# Patient Record
Sex: MALE | Race: WHITE | NOT HISPANIC OR LATINO | Employment: OTHER | URBAN - METROPOLITAN AREA
[De-identification: names, ages, dates, MRNs, and addresses within clinical notes are randomized per-mention and may not be internally consistent; named-entity substitution may affect disease eponyms.]

---
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
## 2022-07-21 ENCOUNTER — HOSPITAL ENCOUNTER (EMERGENCY)
Facility: HOSPITAL | Age: 22
Discharge: HOME | End: 2022-07-21
Attending: EMERGENCY MEDICINE | Admitting: EMERGENCY MEDICINE
Payer: COMMERCIAL

## 2022-07-21 VITALS
TEMPERATURE: 98.4 F | OXYGEN SATURATION: 98 % | BODY MASS INDEX: 18.32 KG/M2 | RESPIRATION RATE: 19 BRPM | HEART RATE: 74 BPM | WEIGHT: 128 LBS | SYSTOLIC BLOOD PRESSURE: 119 MMHG | DIASTOLIC BLOOD PRESSURE: 69 MMHG | HEIGHT: 70 IN

## 2022-07-21 DIAGNOSIS — S06.0XAA CONCUSSION: Primary | ICD-10-CM
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
## 2022-07-21 PROCEDURE — 99284 EMERGENCY DEPT VISIT MOD MDM: CPT | Performed by: EMERGENCY MEDICINE

## 2022-07-21 PROCEDURE — 99283 EMERGENCY DEPT VISIT LOW MDM: CPT
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
## 2022-07-21 NOTE — DISCHARGE INSTRUCTIONS
For concussion follow up please call:  Jose Nick line [phone], or directly:  [facility] IF sports related then call [facility] if NOT sports related then call Arline Hanley call [phone]    ====================================================    You have been diagnosed with a concussion  A concussion (injury to the brain) can cause sleepiness, headache, dizziness, or vomiting  Your brain needs time to heal from this injury  During this healing process you can expected to feel tired, have a mild headache, experience a decrease in your appetite, have forgetfulness, feel irritable, feel sleepy or even have difficulty with sleeping  These symptoms can last for 14 days or longer  You can use Tylenol every 4-6 hours as needed for a headache  No video games, texting, TV, music, work, or exercising/sports for the next 24 hours    See your PCP for activity recommendations  Rest must be continued until gradually advanced by your PCP     -------------------------------------------------------------------------  The goal for concussion treatment is to avoid getting another concussion & to allow your brain to heal   During this healing time we recommend:    1  Get sleep! Resting your brain to allow it to heal is the single most important thing you can do  Now is the time to go to bed early and allow yourself every chance to get a good night's sleep! 2  Your brain will need good nutrition to heal   Avoid junk food & skipping meals  Eat 3 balanced meals a day  Avoid drinking high sugar containing fruit flavored juices, energy drinks & sodas  If you are of alcohol drinking age, you should avoid alcohol during this healing process as well  3   Do not participate in sports or any other recreation physical activities  Take a break from your exercise routine    During this healing time, even simple jogging may cause you to experience worsening symptoms  You may be allowed to resume your normal activities when you are symptom free or after your primary medical care provider clears you from this concussion  If you are a  your Team Physician, Team Trainer or possibly your primary care medical provider can determine when you can begin to return to activity  There is a process that should be followed and those professionals should follow that process  4   Do not expect yourself to function at your highest level of intellectual ability! Try to reschedule taking meaningful tests, exams, or making important decisions until your brain has healed from the concussion  Video moy, internet-surfing, computer work, & reading should be avoided as those activities can be over-stimulating to your injured brain  5   Follow-up with your primary care medical provider within 1 week to review your progress and to determine if you should be referred to a concussion specialist     --------------------------------------------------------------------------  Stepwise Return to Sports instructions for athletes: You may progress to next step after 24 hours if you are symptom free  If you do experience symptoms, you must return to the previous stage and try again after 24 hours of rest and being symptom free  Step 1  On the day of & for the 1st day after you received your concussion, you are to rest- no activity  When the concussion symptoms have gone away & you feel ready to return to your sport, report to your team training &/or physician to be cleared to progress to Step 2  Step 2  Begin with light aerobic exercise, such as walking or stationary cycling  The key is avoiding significant exertion & absolutely no resistance training  Only after symptom free for at least 1 day of this level of activity can you progress to Step 3  Step 3  Progress to sport/activity-specific exercises    Slowly begin to run if your activity includes running  Add the level of exertion slowly during this step  If at anytime you begin to experience symptoms- stop your activity & rest for the rest of the day  You will return to the last Step, number 2, tomorrow  Step 4  After 1 full day of symptom free activity at Step 3, you can begin to engage in non-contact practice, at full speed  Again, if you begin to experience any symptoms, you must stop your activity! You will return to Step 3 tomorrow  Step 5  After a symptom-free day at Step 4, you can advance to full contact practice and/or game play  As you have learned, if at anytime you experience concussion symptoms, you must stop your activity & only return to practice/play after your team medical staff clears you  If this step-wise approach does not make sense to you or if you have any questions- ask for clarification!    -----------------------------------------------------------------------------------------    Call your doctor or return to the emergency department if worse or:  1  Change in behavior or any unusual behavior occurs  2  Confusion occurs (eg, doesnt know your name or where you are)  3  Speech doesnt make sense  4  Worsening dizziness or unsteadiness on feet occurs or if you cant walk normally  5  Vomiting occurs more than 3 times or more than 8 hours after the injury  6  Poor vision or double vision occurs  7  Bleeding or watery fluid from the ears or nose occurs  8  Seizure, twitching, body jerking, or passing out occurs  9  Headache is worse  10  Weakness or numbness of the face, arms, legs, or body occurs

## 2022-07-21 NOTE — Clinical Note
Agustin Paris was seen and treated in our emergency department on 7/21/2022  Diagnosis:     Neeta Clemente  may return to gym class or sports after being cleared by physician  He may return on this date: 08/01/2022         If you have any questions or concerns, please don't hesitate to call        Raphael Martinez MD    ______________________________           _______________          _______________  Hospital Representative                              Date                                Time

## 2023-07-07 ENCOUNTER — OFFICE VISIT (OUTPATIENT)
Dept: URGENT CARE | Facility: CLINIC | Age: 23
End: 2023-07-07
Payer: COMMERCIAL

## 2023-07-07 VITALS
HEIGHT: 70 IN | DIASTOLIC BLOOD PRESSURE: 77 MMHG | BODY MASS INDEX: 18.9 KG/M2 | HEART RATE: 80 BPM | TEMPERATURE: 97.5 F | RESPIRATION RATE: 18 BRPM | SYSTOLIC BLOOD PRESSURE: 118 MMHG | WEIGHT: 132 LBS | OXYGEN SATURATION: 99 %

## 2023-07-07 DIAGNOSIS — H61.23 IMPACTED CERUMEN OF BOTH EARS: Primary | ICD-10-CM

## 2023-07-07 PROCEDURE — 99213 OFFICE O/P EST LOW 20 MIN: CPT

## 2023-07-07 PROCEDURE — 69210 REMOVE IMPACTED EAR WAX UNI: CPT

## 2023-07-07 NOTE — PATIENT INSTRUCTIONS
Fever/Body Aches: OTC Tylenol, ibuprofen, motrin as directed. Cough: OTC Robitussin or Delsym cough syrup. Sore Throat: Warm saltwater gargles, honey, drink plenty of liquids, soft foods. If severe, can utilize OTC chloraseptic spray. Nasal Congestion: OTC saline nasal spray use as directed or OTC flonase, OTC decongestants such as Sudafed as long as no history of high blood pressure . Cool mist humidifier. Vitamin C and Zinc for immune support.

## 2023-07-07 NOTE — PROGRESS NOTES
[facility]        NAME: Deonna Ryan is a 23 y.o. male  : 2000    MRN: [id number]  DATE: 2023  TIME: 3:00 PM    Assessment and Plan   Impacted cerumen of both ears [H61.23]  1. Impacted cerumen of both ears  Ear cerumen removal        Discussed problem with patient. Bilateral cerumen impaction did not allow visualization of TM. Patient was hesitant on jet lavage and opted to remove cerumen via curette. Attempted to remove cerumen using curette. Ran into hard wax and advised the patient use earwax softener and return in 1 week to remove more wax. Should be using Tylenol and ibuprofen for pain complaints. Patient Instructions       Follow up with PCP in 3-5 days. Proceed to  ER if symptoms worsen. Chief Complaint     Chief Complaint   Patient presents with   • Earache     Left ear pain and ear fullness for about a week; difficulty hearing on that side as well          History of Present Illness       Left ear pain and ear fullness for about a week; difficulty hearing on that side as well. Reports issues with cerumen that he struggled with his whole life. Denies any fevers or chills. Is being followed with by ENT for this issue    Earache   Pertinent negatives include no coughing, headaches, rhinorrhea or sore throat. His past medical history is significant for a chronic ear infection and a tympanostomy tube. There is no history of hearing loss. Review of Systems   Review of Systems   Constitutional: Negative for appetite change, chills, fatigue and fever. HENT: Positive for ear pain. Negative for congestion, postnasal drip, rhinorrhea, sinus pressure, sinus pain and sore throat. Respiratory: Negative for cough, shortness of breath, wheezing and stridor. Cardiovascular: Negative for chest pain and palpitations. Musculoskeletal: Negative for myalgias. Neurological: Negative for dizziness, syncope, light-headedness and headaches.          Current Medications     No current outpatient medications on file. Current Allergies     Allergies as of 07/07/2023 - Reviewed 07/07/2023   Allergen Reaction Noted   • Amoxicillin Anaphylaxis 01/16/2007            The following portions of the patient's history were reviewed and updated as appropriate: allergies, current medications, past family history, past medical history, past social history, past surgical history and problem list.     Past Medical History:   Diagnosis Date   • Concussion    • Perforated ear drum        Past Surgical History:   Procedure Laterality Date   • TYMPANOSTOMY TUBE PLACEMENT Bilateral 2006       Family History   Problem Relation Age of Onset   • No Known Problems Mother    • No Known Problems Father          Medications have been verified. Objective   /77   Pulse 80   Temp 97.5 °F (36.4 °C)   Resp 18   Ht 5' 10" (1.778 m)   Wt 59.9 kg (132 lb)   SpO2 99%   BMI 18.94 kg/m²        Physical Exam     Physical Exam  Vitals and nursing note reviewed. Constitutional:       General: He is not in acute distress. Appearance: Normal appearance. He is normal weight. He is not ill-appearing, toxic-appearing or diaphoretic. HENT:      Head: Normocephalic. Right Ear: Ear canal and external ear normal. There is impacted cerumen. Left Ear: Ear canal and external ear normal. There is impacted cerumen. Nose: Nose normal. No congestion or rhinorrhea. Mouth/Throat:      Mouth: Mucous membranes are moist.      Pharynx: Oropharynx is clear. No oropharyngeal exudate or posterior oropharyngeal erythema. Eyes:      General:         Right eye: No discharge. Left eye: No discharge. Extraocular Movements: Extraocular movements intact. Conjunctiva/sclera: Conjunctivae normal.      Pupils: Pupils are equal, round, and reactive to light. Neck:      Vascular: No carotid bruit. Cardiovascular:      Rate and Rhythm: Normal rate and regular rhythm. Pulses: Normal pulses. Heart sounds: Normal heart sounds. No murmur heard. No friction rub. No gallop. Pulmonary:      Effort: Pulmonary effort is normal. No respiratory distress. Breath sounds: Normal breath sounds. No stridor. No wheezing, rhonchi or rales. Chest:      Chest wall: No tenderness. Musculoskeletal:      Cervical back: Normal range of motion and neck supple. No rigidity or tenderness. Lymphadenopathy:      Cervical: No cervical adenopathy. Neurological:      Mental Status: He is alert. Ear cerumen removal    Date/Time: 7/7/2023 2:30 PM    Performed by: Marcelino Fish PA-C  Authorized by: Marcelino Fish PA-C  Universal Protocol:  Consent: Verbal consent obtained. Written consent obtained. Risks and benefits: risks, benefits and alternatives were discussed  Consent given by: patient  Time out: Immediately prior to procedure a "time out" was called to verify the correct patient, procedure, equipment, support staff and site/side marked as required. Timeout called at: 7/7/2023 2:58 PM.  Patient understanding: patient states understanding of the procedure being performed  Patient consent: the patient's understanding of the procedure matches consent given  Patient identity confirmed: verbally with patient      Patient location:  ED  Procedure details:     Location:  L ear    Procedure type: curette      Approach:  External  Post-procedure details:     Complication:  None    Hearing quality:  Improved    Patient tolerance of procedure:   Tolerated well, no immediate complications

## 2023-09-26 ENCOUNTER — OFFICE VISIT (OUTPATIENT)
Dept: URGENT CARE | Facility: CLINIC | Age: 23
End: 2023-09-26
Payer: COMMERCIAL

## 2023-09-26 VITALS
DIASTOLIC BLOOD PRESSURE: 70 MMHG | SYSTOLIC BLOOD PRESSURE: 104 MMHG | RESPIRATION RATE: 18 BRPM | TEMPERATURE: 97 F | WEIGHT: 130 LBS | OXYGEN SATURATION: 99 % | HEART RATE: 94 BPM | BODY MASS INDEX: 18.61 KG/M2 | HEIGHT: 70 IN

## 2023-09-26 DIAGNOSIS — H61.23 BILATERAL IMPACTED CERUMEN: Primary | ICD-10-CM

## 2023-09-26 PROCEDURE — 99213 OFFICE O/P EST LOW 20 MIN: CPT | Performed by: PHYSICIAN ASSISTANT

## 2023-09-26 PROCEDURE — 69210 REMOVE IMPACTED EAR WAX UNI: CPT | Performed by: PHYSICIAN ASSISTANT

## 2023-09-26 NOTE — PATIENT INSTRUCTIONS
Avoid Q-Tip use  Over the counter debrox drops  Follow up with ENT if symptoms persist  Follow up with PCP in 3-5 days. Proceed to  ER if symptoms worsen.

## 2023-09-26 NOTE — PROGRESS NOTES
[facility]        NAME: Willian Bloom is a 23 y.o. male  : 2000    MRN: [id number]  DATE: 2023  TIME: 11:48 AM    Assessment and Plan   Bilateral impacted cerumen [H61.23]  1. Bilateral impacted cerumen  Ear cerumen removal            Patient Instructions     Avoid Q-Tip use  Over the counter debrox drops  Follow up with ENT if symptoms persist  Follow up with PCP in 3-5 days. Proceed to  ER if symptoms worsen. Chief Complaint     Chief Complaint   Patient presents with   • Ear Fullness     C/o bilateral ear fullness. Onset yesterday. History of Present Illness       Ear Fullness   There is pain in both ears. This is a new problem. The current episode started yesterday. Associated symptoms include hearing loss. He has tried nothing for the symptoms. Review of Systems   Review of Systems   HENT: Positive for ear pain and hearing loss. Current Medications     No current outpatient medications on file. Current Allergies     Allergies as of 2023 - Reviewed 2023   Allergen Reaction Noted   • Amoxicillin Anaphylaxis 2007            The following portions of the patient's history were reviewed and updated as appropriate: allergies, current medications, past family history, past medical history, past social history, past surgical history and problem list.     Past Medical History:   Diagnosis Date   • Concussion    • Perforated ear drum        Past Surgical History:   Procedure Laterality Date   • TYMPANOSTOMY TUBE PLACEMENT Bilateral        Family History   Problem Relation Age of Onset   • No Known Problems Mother    • No Known Problems Father          Medications have been verified. Objective   /70   Pulse 94   Temp (!) 97 °F (36.1 °C)   Resp 18   Ht 5' 10" (1.778 m)   Wt 59 kg (130 lb)   SpO2 99%   BMI 18.65 kg/m²   No LMP for male patient. Physical Exam     Physical Exam  Vitals reviewed. Constitutional:       General: He is not in acute distress. Appearance: He is well-developed. He is not diaphoretic. HENT:      Head: Normocephalic and atraumatic. Right Ear: There is impacted cerumen. Left Ear: There is impacted cerumen. Nose: Nose normal.   Cardiovascular:      Rate and Rhythm: Normal rate and regular rhythm. Heart sounds: Normal heart sounds. No murmur heard. No friction rub. No gallop. Pulmonary:      Effort: Pulmonary effort is normal. No respiratory distress. Breath sounds: Normal breath sounds. No wheezing, rhonchi or rales. Skin:     General: Skin is warm. Neurological:      Mental Status: He is alert. Psychiatric:         Behavior: Behavior normal.         Thought Content: Thought content normal.         Judgment: Judgment normal.       Ear cerumen removal    Date/Time: 9/26/2023 11:30 AM    Performed by: Terryann Kawasaki, PA-C  Authorized by: Terryann Kawasaki, PA-C  Universal Protocol:  Consent: Verbal consent obtained. Risks and benefits: risks, benefits and alternatives were discussed  Consent given by: patient  Patient identity confirmed: verbally with patient      Procedure details:     Location:  R ear and L ear    Procedure type: curette      Approach:  External  Post-procedure details:     Complication:  None    Hearing quality:  Improved    Patient tolerance of procedure:   Tolerated well, no immediate complications

## 2023-09-26 NOTE — LETTER
September 26, 2023     Patient: Ilda Monge   YOB: 2000   Date of Visit: 9/26/2023       To Whom It May Concern:    Milly Robe was seen in the [facility] on 9/26/2023. If you have any questions or concerns, please don't hesitate to call.          Sincerely,        Mich Leon PA-C    CC: No Recipients

## 2023-09-29 ENCOUNTER — APPOINTMENT (EMERGENCY)
Dept: RADIOLOGY | Facility: HOSPITAL | Age: 23
End: 2023-09-29
Payer: COMMERCIAL

## 2023-09-29 ENCOUNTER — APPOINTMENT (EMERGENCY)
Dept: CT IMAGING | Facility: HOSPITAL | Age: 23
End: 2023-09-29
Payer: COMMERCIAL

## 2023-09-29 ENCOUNTER — HOSPITAL ENCOUNTER (EMERGENCY)
Facility: HOSPITAL | Age: 23
Discharge: HOME | End: 2023-09-29
Attending: EMERGENCY MEDICINE | Admitting: EMERGENCY MEDICINE
Payer: COMMERCIAL

## 2023-09-29 VITALS
RESPIRATION RATE: 20 BRPM | WEIGHT: 136.02 LBS | HEART RATE: 87 BPM | BODY MASS INDEX: 19.52 KG/M2 | OXYGEN SATURATION: 100 % | DIASTOLIC BLOOD PRESSURE: 70 MMHG | SYSTOLIC BLOOD PRESSURE: 118 MMHG | TEMPERATURE: 98.4 F

## 2023-09-29 DIAGNOSIS — T07.XXXA MULTIPLE CONTUSIONS: ICD-10-CM

## 2023-09-29 DIAGNOSIS — S69.91XA INJURY OF RIGHT THUMB, INITIAL ENCOUNTER: Primary | ICD-10-CM

## 2023-09-29 LAB
ABO GROUP BLD: NORMAL
ABO GROUP BLD: NORMAL
ALBUMIN SERPL BCP-MCNC: 4.7 G/DL (ref 3.5–5)
ALP SERPL-CCNC: 56 U/L (ref 34–104)
ALT SERPL W P-5'-P-CCNC: 10 U/L (ref 7–52)
ANION GAP SERPL CALCULATED.3IONS-SCNC: 8 MMOL/L
APTT PPP: 26 SECONDS (ref 23–37)
AST SERPL W P-5'-P-CCNC: 17 U/L (ref 13–39)
BASOPHILS # BLD AUTO: 0.04 THOUSANDS/ÂΜL (ref 0–0.1)
BASOPHILS NFR BLD AUTO: 1 % (ref 0–1)
BILIRUB SERPL-MCNC: 0.6 MG/DL (ref 0.2–1)
BLD GP AB SCN SERPL QL: NEGATIVE
BUN SERPL-MCNC: 14 MG/DL (ref 5–25)
CALCIUM SERPL-MCNC: 9.3 MG/DL (ref 8.4–10.2)
CHLORIDE SERPL-SCNC: 104 MMOL/L (ref 96–108)
CO2 SERPL-SCNC: 24 MMOL/L (ref 21–32)
CREAT SERPL-MCNC: 1.02 MG/DL (ref 0.6–1.3)
EOSINOPHIL # BLD AUTO: 0.1 THOUSAND/ÂΜL (ref 0–0.61)
EOSINOPHIL NFR BLD AUTO: 2 % (ref 0–6)
ERYTHROCYTE [DISTWIDTH] IN BLOOD BY AUTOMATED COUNT: 11.8 % (ref 11.6–15.1)
GFR SERPL CREATININE-BSD FRML MDRD: 105 ML/MIN/1.73SQ M
GLUCOSE SERPL-MCNC: 115 MG/DL (ref 65–140)
HCT VFR BLD AUTO: 42.1 % (ref 36.5–49.3)
HGB BLD-MCNC: 14.9 G/DL (ref 12–17)
IMM GRANULOCYTES # BLD AUTO: 0.02 THOUSAND/UL (ref 0–0.2)
IMM GRANULOCYTES NFR BLD AUTO: 0 % (ref 0–2)
INR PPP: 0.97 (ref 0.84–1.19)
LYMPHOCYTES # BLD AUTO: 2.82 THOUSANDS/ÂΜL (ref 0.6–4.47)
LYMPHOCYTES NFR BLD AUTO: 41 % (ref 14–44)
MCH RBC QN AUTO: 31.6 PG (ref 26.8–34.3)
MCHC RBC AUTO-ENTMCNC: 35.4 G/DL (ref 31.4–37.4)
MCV RBC AUTO: 89 FL (ref 82–98)
MONOCYTES # BLD AUTO: 0.55 THOUSAND/ÂΜL (ref 0.17–1.22)
MONOCYTES NFR BLD AUTO: 8 % (ref 4–12)
NEUTROPHILS # BLD AUTO: 3.29 THOUSANDS/ÂΜL (ref 1.85–7.62)
NEUTS SEG NFR BLD AUTO: 48 % (ref 43–75)
NRBC BLD AUTO-RTO: 0 /100 WBCS
PLATELET # BLD AUTO: 215 THOUSANDS/UL (ref 149–390)
PMV BLD AUTO: 9.5 FL (ref 8.9–12.7)
POTASSIUM SERPL-SCNC: 3.4 MMOL/L (ref 3.5–5.3)
PROT SERPL-MCNC: 7.1 G/DL (ref 6.4–8.4)
PROTHROMBIN TIME: 13.2 SECONDS (ref 11.6–14.5)
RBC # BLD AUTO: 4.72 MILLION/UL (ref 3.88–5.62)
RH BLD: POSITIVE
RH BLD: POSITIVE
SODIUM SERPL-SCNC: 136 MMOL/L (ref 135–147)
SPECIMEN EXPIRATION DATE: NORMAL
WBC # BLD AUTO: 6.82 THOUSAND/UL (ref 4.31–10.16)

## 2023-09-29 PROCEDURE — 86850 RBC ANTIBODY SCREEN: CPT | Performed by: EMERGENCY MEDICINE

## 2023-09-29 PROCEDURE — 36415 COLL VENOUS BLD VENIPUNCTURE: CPT | Performed by: EMERGENCY MEDICINE

## 2023-09-29 PROCEDURE — 71045 X-RAY EXAM CHEST 1 VIEW: CPT

## 2023-09-29 PROCEDURE — 71260 CT THORAX DX C+: CPT

## 2023-09-29 PROCEDURE — 93005 ELECTROCARDIOGRAM TRACING: CPT

## 2023-09-29 PROCEDURE — 70450 CT HEAD/BRAIN W/O DYE: CPT

## 2023-09-29 PROCEDURE — 85730 THROMBOPLASTIN TIME PARTIAL: CPT | Performed by: EMERGENCY MEDICINE

## 2023-09-29 PROCEDURE — 73030 X-RAY EXAM OF SHOULDER: CPT

## 2023-09-29 PROCEDURE — 73130 X-RAY EXAM OF HAND: CPT

## 2023-09-29 PROCEDURE — 99284 EMERGENCY DEPT VISIT MOD MDM: CPT

## 2023-09-29 PROCEDURE — 73000 X-RAY EXAM OF COLLAR BONE: CPT

## 2023-09-29 PROCEDURE — 86901 BLOOD TYPING SEROLOGIC RH(D): CPT | Performed by: EMERGENCY MEDICINE

## 2023-09-29 PROCEDURE — 85610 PROTHROMBIN TIME: CPT | Performed by: EMERGENCY MEDICINE

## 2023-09-29 PROCEDURE — 86900 BLOOD TYPING SEROLOGIC ABO: CPT | Performed by: EMERGENCY MEDICINE

## 2023-09-29 PROCEDURE — 72125 CT NECK SPINE W/O DYE: CPT

## 2023-09-29 PROCEDURE — 72170 X-RAY EXAM OF PELVIS: CPT

## 2023-09-29 PROCEDURE — 80053 COMPREHEN METABOLIC PANEL: CPT | Performed by: EMERGENCY MEDICINE

## 2023-09-29 PROCEDURE — 73564 X-RAY EXAM KNEE 4 OR MORE: CPT

## 2023-09-29 PROCEDURE — 85025 COMPLETE CBC W/AUTO DIFF WBC: CPT | Performed by: EMERGENCY MEDICINE

## 2023-09-29 PROCEDURE — 74177 CT ABD & PELVIS W/CONTRAST: CPT

## 2023-09-29 RX ADMIN — IOHEXOL 100 ML: 350 INJECTION, SOLUTION INTRAVENOUS at 19:44

## 2023-09-29 NOTE — Clinical Note
Sonya Hernandez was seen and treated in our emergency department on 9/29/2023. Diagnosis:     Bebeto Jenkins  may return to work on return date. He may return on this date: 10/03/2023         If you have any questions or concerns, please don't hesitate to call.       Semaj Vanegas MD    ______________________________           _______________          _______________  Hospital Representative                              Date                                Time

## 2023-09-29 NOTE — Clinical Note
Mojgan Stahl was seen and treated in our emergency department on 9/29/2023. Diagnosis:     Yani Maddox  may return to work on return date. He may return on this date: 10/03/2023         If you have any questions or concerns, please don't hesitate to call.       Deidre Mckeon MD    ______________________________           _______________          _______________  Hospital Representative                              Date                                Time

## 2023-09-29 NOTE — Clinical Note
Milly Nagel was seen and treated in our emergency department on 9/29/2023. Diagnosis:     Pan Perkins  may return to work on return date. He may return on this date: 10/03/2023         If you have any questions or concerns, please don't hesitate to call.       Leonor Jauregui MD    ______________________________           _______________          _______________  Hospital Representative                              Date                                Time
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
## 2023-09-30 NOTE — DISCHARGE INSTRUCTIONS
Ice to areas of pain. Please take Tylenol and/or Advil/Motrin/ibuprofen as needed for pain and swelling.

## 2023-10-02 ENCOUNTER — TELEPHONE (OUTPATIENT)
Age: 23
End: 2023-10-02

## 2023-10-02 LAB
ATRIAL RATE: 81 BPM
P AXIS: 73 DEGREES
PR INTERVAL: 138 MS
QRS AXIS: 85 DEGREES
QRSD INTERVAL: 88 MS
QT INTERVAL: 382 MS
QTC INTERVAL: 443 MS
T WAVE AXIS: 72 DEGREES
VENTRICULAR RATE: 81 BPM
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
## 2023-10-03 ENCOUNTER — OFFICE VISIT (OUTPATIENT)
Dept: OBGYN CLINIC | Facility: MEDICAL CENTER | Age: 23
End: 2023-10-03
Payer: COMMERCIAL

## 2023-10-03 ENCOUNTER — APPOINTMENT (OUTPATIENT)
Dept: RADIOLOGY | Facility: MEDICAL CENTER | Age: 23
End: 2023-10-03
Payer: COMMERCIAL

## 2023-10-03 VITALS
HEART RATE: 79 BPM | HEIGHT: 70 IN | SYSTOLIC BLOOD PRESSURE: 121 MMHG | WEIGHT: 129.8 LBS | DIASTOLIC BLOOD PRESSURE: 68 MMHG | BODY MASS INDEX: 18.58 KG/M2

## 2023-10-03 DIAGNOSIS — S50.01XA CONTUSION OF RIGHT ELBOW, INITIAL ENCOUNTER: ICD-10-CM

## 2023-10-03 DIAGNOSIS — S63.641A SPRAIN OF METACARPOPHALANGEAL (MCP) JOINT OF RIGHT THUMB, INITIAL ENCOUNTER: Primary | ICD-10-CM
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
## 2023-10-03 DIAGNOSIS — M25.521 PAIN IN RIGHT ELBOW: ICD-10-CM

## 2023-10-03 DIAGNOSIS — M79.644 PAIN OF RIGHT THUMB: ICD-10-CM

## 2023-10-03 PROCEDURE — 73080 X-RAY EXAM OF ELBOW: CPT

## 2023-10-03 PROCEDURE — 99203 OFFICE O/P NEW LOW 30 MIN: CPT | Performed by: ORTHOPAEDIC SURGERY

## 2023-10-03 NOTE — LETTER
October 3, 2023     Patient: Eliza Lindquist  YOB: 2000  Date of Visit: 10/3/2023      To Whom it May Concern:    Lisa Pierson is under my professional care. Jose G Hoang was seen in my office on 10/3/2023. Jose G Hoang may return to work on 10/16/23 and wear brace all of the time. No pushing, pulling, or lifting more than 5 pounds with right upper extremity . If you have any questions or concerns, please don't hesitate to call.          Sincerely,          Ashish Wilson MD        CC: No Recipients
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
## 2023-10-10 NOTE — TELEPHONE ENCOUNTER
Caller: Patient mom    Doctor: Petra Ortiz    Reason for call:     Patient's is calling because the script for the MRI thumb right wo contrast (Order ID: [id number])  was canceled by due to the claim wanting her to go to the [address] [place] [address] at [address] [place] [address]. Please fax MRI order to [phone]  Phone is [phone]     Call back#: Please call her back once new order is ready so she can schedule at [facility].

## 2023-10-11 NOTE — TELEPHONE ENCOUNTER
Van Gates, the patient does have [facility] as well. Marie Rodriguez from the imaging center only asked about authorization from [employer]. Because they would bill auto first then [employer].
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
## 2023-10-19 ENCOUNTER — OFFICE VISIT (OUTPATIENT)
Dept: OBGYN CLINIC | Facility: MEDICAL CENTER | Age: 23
End: 2023-10-19

## 2023-10-19 VITALS
BODY MASS INDEX: 18.47 KG/M2 | HEART RATE: 62 BPM | HEIGHT: 70 IN | WEIGHT: 129 LBS | SYSTOLIC BLOOD PRESSURE: 126 MMHG | DIASTOLIC BLOOD PRESSURE: 81 MMHG

## 2023-10-19 DIAGNOSIS — S63.641A RUPTURE OF ULNAR COLLATERAL LIGAMENT OF RIGHT THUMB, INITIAL ENCOUNTER: Primary | ICD-10-CM

## 2023-10-19 DIAGNOSIS — T14.8XXA LIGAMENT TEAR: ICD-10-CM

## 2023-10-19 DIAGNOSIS — M79.644 PAIN OF RIGHT THUMB: ICD-10-CM
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
## 2023-10-25 ENCOUNTER — OFFICE VISIT (OUTPATIENT)
Dept: PHYSICAL THERAPY | Facility: MEDICAL CENTER | Age: 23
End: 2023-10-25
Payer: COMMERCIAL

## 2023-10-25 DIAGNOSIS — M79.644 PAIN OF RIGHT THUMB: ICD-10-CM

## 2023-10-25 DIAGNOSIS — T14.8XXA LIGAMENT TEAR: ICD-10-CM

## 2023-10-25 PROCEDURE — L3913 HFO W/O JOINTS CF: HCPCS | Performed by: PHYSICAL THERAPIST

## 2023-10-25 NOTE — PROGRESS NOTES
Splint    Diagnosis:   1. Pain of right thumb  Ambulatory Referral to PT/OT Hand Therapy      2. Ligament tear  Ambulatory Referral to PT/OT Hand Therapy         Indication: Protection/Immobilization    Location: Right  hand and thumb  Supplies: Orthotics  Thermoplastic material    Splint type: thumb spica  Wearing Schedule: Remove for hygiene only  Describe Position: Thumb in functional abduction, IP free    Precautions: skin/universal precautions    Patient or Caregiver expresses understanding of wearing Schedule and Precautions? Yes  Patient or Caregiver able to don/doff orthotic independently? Yes    Written orders provided to patient?  Yes  Orders Obtained: Written  Orders Obtained from: Shama Napoles MD

## 2023-11-30 ENCOUNTER — OFFICE VISIT (OUTPATIENT)
Dept: OBGYN CLINIC | Facility: MEDICAL CENTER | Age: 23
End: 2023-11-30
Payer: COMMERCIAL

## 2023-11-30 VITALS
HEART RATE: 72 BPM | BODY MASS INDEX: 17.9 KG/M2 | HEIGHT: 70 IN | DIASTOLIC BLOOD PRESSURE: 76 MMHG | SYSTOLIC BLOOD PRESSURE: 118 MMHG | WEIGHT: 125 LBS

## 2023-11-30 DIAGNOSIS — S63.641A RUPTURE OF ULNAR COLLATERAL LIGAMENT OF RIGHT THUMB, INITIAL ENCOUNTER: Primary | ICD-10-CM

## 2023-11-30 PROCEDURE — 99213 OFFICE O/P EST LOW 20 MIN: CPT | Performed by: ORTHOPAEDIC SURGERY

## 2023-11-30 NOTE — PROGRESS NOTES
HAND & UPPER EXTREMITY OFFICE VISIT   Referred By:  No referring provider defined for this encounter. Chief Complaint:     Right thumb pain    Previous History:   Now approximately 6 weeks status post right thumb ulnar collateral ligament injury confirmed on MRI. Has been wearing a brace and managing this nonoperatively. Interval History:  He reports has been compliant with his brace wear. Joint taken off for hygiene. He is wearing at all times at work and limiting his activities. He states his pain is improved significantly. His thumb does feel a bit stiff when he takes off his brace to shower but otherwise he is very happy with his recovery so far. Denies numbness or tingling in the hand.     Past Medical History:  Past Medical History:   Diagnosis Date    Concussion     Perforated ear drum      Past Surgical History:   Procedure Laterality Date    TYMPANOSTOMY TUBE PLACEMENT Bilateral 2006     Family History   Problem Relation Age of Onset    No Known Problems Mother     No Known Problems Father      Social History     Socioeconomic History    Marital status: Single     Spouse name: Not on file    Number of children: Not on file    Years of education: Not on file    Highest education level: Not on file   Occupational History    Not on file   Tobacco Use    Smoking status: Never    Smokeless tobacco: Current     Types: Chew   Vaping Use    Vaping Use: Never used   Substance and Sexual Activity    Alcohol use: Not Currently    Drug use: Not Currently    Sexual activity: Not on file   Other Topics Concern    Not on file   Social History Narrative    Not on file     Social Determinants of Health     Financial Resource Strain: Not on file   Food Insecurity: Not on file   Transportation Needs: Not on file   Physical Activity: Not on file   Stress: Not on file   Social Connections: Not on file   Intimate Partner Violence: Not on file   Housing Stability: Not on file     Scheduled Meds:  Continuous Infusions:No current facility-administered medications for this visit. PRN Meds:. Allergies   Allergen Reactions    Amoxicillin Anaphylaxis     hives    Penicillin G Anaphylaxis       Physical Examination:    /76   Pulse 72   Ht 5' 10" (1.778 m)   Wt 56.7 kg (125 lb)   BMI 17.94 kg/m²     Gen: A&Ox3, NAD  Cardiac: regular rate  Chest: non labored breathing  Abdomen: Non-distended      Right Upper Extremity:  Brace removed, underlying skin CDI  No obvious deformity of the shoulder, arm, elbow, forearm, wrist, hand  No significant swelling  Nontender at the thumb MP UCL or RCL  Decreased thumb MCP flexion but able to oppose thumb to the base of small finger  Thumb MP joint is stable to radial and ulnar stress at 0 and 30 degrees of flexion. This is similar to the contralateral side with a firm endpoint  Sensation intact to light touch in the axillary median, ulnar, and radial nerve distributions  2+RP      Left Upper Extremity:  Skin CDI  No obvious deformity of the shoulder, arm, elbow, forearm, wrist, hand  Composite fist  Thumb MP joint is stable to radial ulnar stress at 0 and 30 degrees flexion with a firm endpoint  Sensation intact to light touch in the axillary median, ulnar, and radial nerve distributions  2+RP    Studies:  No new imaging    Assessment and Plan:  1. Rupture of ulnar collateral ligament of right thumb, initial encounter            23 y.o. male presents in follow up for the above diagnosis. He is now 6 weeks status post injury and has been wearing his brace full-time. His joint is stable and he has no pain. He can start to wean out of his brace and increase his activities. I expect him to still wear his brace during heavier activities and lifting. He can start returning to weightlifting exercises but recommend avoiding radially directed stress on the thumb MP joint. We discussed different  he can use for benchpress and dead lift to avoid further injury to his thumb.   He will follow-up in approximately 6 weeks for range of motion check. he expressed understanding of the plan and agreed. We encouraged them to contact our office with any questions or concerns. Alana Almanza MD  Hand and Upper Extremity Surgery      *This note was dictated using Dragon voice recognition software. Please excuse any word substitutions or errors. *
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
## 2024-01-25 ENCOUNTER — HOSPITAL ENCOUNTER (EMERGENCY)
Facility: HOSPITAL | Age: 24
Discharge: HOME | End: 2024-01-25
Attending: EMERGENCY MEDICINE | Admitting: EMERGENCY MEDICINE
Payer: COMMERCIAL

## 2024-01-25 VITALS
DIASTOLIC BLOOD PRESSURE: 65 MMHG | TEMPERATURE: 97.7 F | OXYGEN SATURATION: 94 % | SYSTOLIC BLOOD PRESSURE: 111 MMHG | HEART RATE: 94 BPM | RESPIRATION RATE: 18 BRPM

## 2024-01-25 DIAGNOSIS — L50.9 URTICARIA: Primary | ICD-10-CM

## 2024-01-25 PROCEDURE — 99284 EMERGENCY DEPT VISIT MOD MDM: CPT | Performed by: EMERGENCY MEDICINE

## 2024-01-25 PROCEDURE — 99282 EMERGENCY DEPT VISIT SF MDM: CPT

## 2024-01-25 RX ORDER — HYDROXYZINE HYDROCHLORIDE 25 MG/1
25 TABLET, FILM COATED ORAL ONCE
Status: COMPLETED | OUTPATIENT
Start: 2024-01-25 | End: 2024-01-25

## 2024-01-25 RX ORDER — HYDROXYZINE HYDROCHLORIDE 25 MG/1
25 TABLET, FILM COATED ORAL EVERY 6 HOURS
Qty: 12 TABLET | Refills: 0 | Status: SHIPPED | OUTPATIENT
Start: 2024-01-25

## 2024-01-25 RX ORDER — PREDNISONE 20 MG/1
40 TABLET ORAL DAILY
Qty: 10 TABLET | Refills: 0 | Status: SHIPPED | OUTPATIENT
Start: 2024-01-25 | End: 2024-01-30

## 2024-01-25 RX ORDER — PREDNISONE 20 MG/1
40 TABLET ORAL ONCE
Status: COMPLETED | OUTPATIENT
Start: 2024-01-25 | End: 2024-01-25

## 2024-01-25 RX ADMIN — HYDROXYZINE HYDROCHLORIDE 25 MG: 25 TABLET ORAL at 16:33

## 2024-01-25 RX ADMIN — PREDNISONE 40 MG: 20 TABLET ORAL at 16:33

## 2024-01-25 NOTE — ED PROVIDER NOTES
History  Chief Complaint   Patient presents with    Rash     Pt states full body rash since last night.      Patient states that 2 days ago he developed a small red spot on his left eyebrow and on his neck.  Now full body.  Itching.  Benadryl without any relief.  No trouble swallowing.  No trouble talking.  No drooling.  No shortness of breath.  Patient noticed that when he gets tattoos he develops redness around the arms.  Has been getting progressively worse with his reactions.      History provided by:  Patient   used: No    Rash  Location:  Full body  Quality: itchiness, redness and swelling    Severity:  Mild  Onset quality:  Gradual  Duration:  2 days  Timing:  Constant  Progression:  Worsening  Chronicity:  New  Context: not diapers, not exposure to similar rash, not medications and not sun exposure    Relieved by:  Nothing  Worsened by:  Nothing  Ineffective treatments:  Antihistamines  Associated symptoms: no abdominal pain, no diarrhea, no fatigue, no fever, no headaches, no induration, no nausea, no shortness of breath, no sore throat, no throat swelling, no tongue swelling, not vomiting and not wheezing        None       Past Medical History:   Diagnosis Date    Concussion     Perforated ear drum        Past Surgical History:   Procedure Laterality Date    TYMPANOSTOMY TUBE PLACEMENT Bilateral 2006       Family History   Problem Relation Age of Onset    No Known Problems Mother     No Known Problems Father      I have reviewed and agree with the history as documented.    E-Cigarette/Vaping    E-Cigarette Use Never User      E-Cigarette/Vaping Substances    Nicotine No     THC No     CBD No     Flavoring No     Other No     Unknown No      Social History     Tobacco Use    Smoking status: Never    Smokeless tobacco: Current     Types: Chew   Vaping Use    Vaping status: Never Used   Substance Use Topics    Alcohol use: Not Currently    Drug use: Not Currently       Review of Systems    Constitutional:  Negative for chills, fatigue and fever.   HENT:  Negative for ear pain, hearing loss, sore throat, trouble swallowing and voice change.    Eyes:  Negative for pain and discharge.   Respiratory:  Negative for cough, shortness of breath and wheezing.    Cardiovascular:  Negative for chest pain and palpitations.   Gastrointestinal:  Negative for abdominal pain, blood in stool, constipation, diarrhea, nausea and vomiting.   Genitourinary:  Negative for dysuria, flank pain, frequency and hematuria.   Musculoskeletal:  Negative for joint swelling, neck pain and neck stiffness.   Skin:  Positive for rash. Negative for wound.   Neurological:  Negative for dizziness, seizures, syncope, facial asymmetry and headaches.   Psychiatric/Behavioral:  Negative for hallucinations, self-injury and suicidal ideas.    All other systems reviewed and are negative.      Physical Exam  Physical Exam  Vitals and nursing note reviewed.   Constitutional:       General: He is not in acute distress.     Appearance: He is well-developed.   HENT:      Head: Normocephalic and atraumatic.      Right Ear: External ear normal.      Left Ear: External ear normal.      Mouth/Throat:      Comments: Oropharynx without any edema  Eyes:      General: No scleral icterus.        Right eye: No discharge.         Left eye: No discharge.      Extraocular Movements: Extraocular movements intact.      Conjunctiva/sclera: Conjunctivae normal.   Neck:      Comments: No stridor  Cardiovascular:      Rate and Rhythm: Normal rate and regular rhythm.      Heart sounds: Normal heart sounds. No murmur heard.  Pulmonary:      Effort: Pulmonary effort is normal.      Breath sounds: Normal breath sounds. No wheezing or rales.   Abdominal:      General: Bowel sounds are normal. There is no distension.      Palpations: Abdomen is soft.      Tenderness: There is no abdominal tenderness. There is no guarding or rebound.   Musculoskeletal:         General: No  deformity. Normal range of motion.      Cervical back: Normal range of motion and neck supple.   Skin:     General: Skin is warm and dry.      Findings: Rash present.      Comments: Diffuse urticarial rash throughout his body.   Neurological:      General: No focal deficit present.      Mental Status: He is alert and oriented to person, place, and time.      Cranial Nerves: No cranial nerve deficit.   Psychiatric:         Mood and Affect: Mood normal.         Behavior: Behavior normal.         Thought Content: Thought content normal.         Judgment: Judgment normal.         Vital Signs  ED Triage Vitals [01/25/24 1615]   Temperature Pulse Respirations Blood Pressure SpO2   97.7 °F (36.5 °C) (!) 118 18 (!) 121/102 99 %      Temp Source Heart Rate Source Patient Position - Orthostatic VS BP Location FiO2 (%)   Temporal Monitor Sitting Right arm --      Pain Score       --           Vitals:    01/25/24 1615   BP: (!) 121/102   Pulse: (!) 118   Patient Position - Orthostatic VS: Sitting         Visual Acuity      ED Medications  Medications   predniSONE tablet 40 mg (has no administration in time range)   hydrOXYzine HCL (ATARAX) tablet 25 mg (has no administration in time range)       Diagnostic Studies  Results Reviewed       None                   No orders to display              Procedures  Procedures         ED Course                               SBIRT 20yo+      Flowsheet Row Most Recent Value   Initial Alcohol Screen: US AUDIT-C     1. How often do you have a drink containing alcohol? 0 Filed at: 01/25/2024 1617   2. How many drinks containing alcohol do you have on a typical day you are drinking?  0 Filed at: 01/25/2024 1617   3a. Male UNDER 65: How often do you have five or more drinks on one occasion? 0 Filed at: 01/25/2024 1617   Audit-C Score 0 Filed at: 01/25/2024 1617   DALIA: How many times in the past year have you...    Used an illegal drug or used a prescription medication for non-medical reasons?  Never Filed at: 01/25/2024 1617                      Medical Decision Making           Disposition  Final diagnoses:   Urticaria     Time reflects when diagnosis was documented in both MDM as applicable and the Disposition within this note       Time User Action Codes Description Comment    1/25/2024  4:27 PM Juan C Queen Add [L50.9] Urticaria           ED Disposition       ED Disposition   Discharge    Condition   Stable    Date/Time   Thu Jan 25, 2024 1627    Comment   Martínez Ellington discharge to home/self care.                   Follow-up Information       Follow up With Specialties Details Why Contact Info    Lucille Quinteros DO Family Medicine Call in 1 day  [address] [facility] [address] [phone]              Patient's Medications   Discharge Prescriptions    HYDROXYZINE HCL (ATARAX) 25 MG TABLET    Take 1 tablet (25 mg total) by mouth every 6 (six) hours       Start Date: 1/25/2024 End Date: --       Order Dose: 25 mg       Quantity: 12 tablet    Refills: 0    PREDNISONE 20 MG TABLET    Take 2 tablets (40 mg total) by mouth daily for 5 days       Start Date: 1/25/2024 End Date: 1/30/2024       Order Dose: 40 mg       Quantity: 10 tablet    Refills: 0       No discharge procedures on file.    PDMP Review       None            ED Provider  Electronically Signed by             Juan C Queen MD  01/25/24 [id number]

## 2024-01-27 ENCOUNTER — HOSPITAL ENCOUNTER (EMERGENCY)
Facility: HOSPITAL | Age: 24
Discharge: HOME | End: 2024-01-27
Attending: EMERGENCY MEDICINE
Payer: COMMERCIAL

## 2024-01-27 ENCOUNTER — APPOINTMENT (EMERGENCY)
Dept: CT IMAGING | Facility: HOSPITAL | Age: 24
End: 2024-01-27
Payer: COMMERCIAL

## 2024-01-27 VITALS
SYSTOLIC BLOOD PRESSURE: 105 MMHG | RESPIRATION RATE: 18 BRPM | DIASTOLIC BLOOD PRESSURE: 61 MMHG | OXYGEN SATURATION: 99 % | HEART RATE: 87 BPM | TEMPERATURE: 98.8 F

## 2024-01-27 DIAGNOSIS — L50.9 URTICARIA: ICD-10-CM

## 2024-01-27 DIAGNOSIS — R10.84 GENERALIZED ABDOMINAL PAIN: Primary | ICD-10-CM

## 2024-01-27 LAB
ALBUMIN SERPL BCP-MCNC: 4.9 G/DL (ref 3.5–5)
ALP SERPL-CCNC: 56 U/L (ref 34–104)
ALT SERPL W P-5'-P-CCNC: 9 U/L (ref 7–52)
ANION GAP SERPL CALCULATED.3IONS-SCNC: 9 MMOL/L
APTT PPP: 25 SECONDS (ref 23–37)
AST SERPL W P-5'-P-CCNC: 14 U/L (ref 13–39)
BASOPHILS # BLD AUTO: 0.03 THOUSANDS/ÂΜL (ref 0–0.1)
BASOPHILS NFR BLD AUTO: 0 % (ref 0–1)
BILIRUB SERPL-MCNC: 0.61 MG/DL (ref 0.2–1)
BILIRUB UR QL STRIP: ABNORMAL
BUN SERPL-MCNC: 15 MG/DL (ref 5–25)
CALCIUM SERPL-MCNC: 10.1 MG/DL (ref 8.4–10.2)
CHLORIDE SERPL-SCNC: 104 MMOL/L (ref 96–108)
CLARITY UR: ABNORMAL
CO2 SERPL-SCNC: 26 MMOL/L (ref 21–32)
COLOR UR: YELLOW
CREAT SERPL-MCNC: 1.08 MG/DL (ref 0.6–1.3)
EOSINOPHIL # BLD AUTO: 0.03 THOUSAND/ÂΜL (ref 0–0.61)
EOSINOPHIL NFR BLD AUTO: 0 % (ref 0–6)
ERYTHROCYTE [DISTWIDTH] IN BLOOD BY AUTOMATED COUNT: 11.9 % (ref 11.6–15.1)
GFR SERPL CREATININE-BSD FRML MDRD: 97 ML/MIN/1.73SQ M
GLUCOSE SERPL-MCNC: 98 MG/DL (ref 65–140)
GLUCOSE UR STRIP-MCNC: NEGATIVE MG/DL
HCT VFR BLD AUTO: 44.9 % (ref 36.5–49.3)
HGB BLD-MCNC: 16.1 G/DL (ref 12–17)
HGB UR QL STRIP.AUTO: NEGATIVE
HOLD SPECIMEN: NORMAL
IMM GRANULOCYTES # BLD AUTO: 0.05 THOUSAND/UL (ref 0–0.2)
IMM GRANULOCYTES NFR BLD AUTO: 0 % (ref 0–2)
INR PPP: 0.99 (ref 0.84–1.19)
KETONES UR STRIP-MCNC: NEGATIVE MG/DL
LACTATE SERPL-SCNC: 2.2 MMOL/L (ref 0.5–2)
LEUKOCYTE ESTERASE UR QL STRIP: NEGATIVE
LIPASE SERPL-CCNC: 9 U/L (ref 11–82)
LYMPHOCYTES # BLD AUTO: 3.39 THOUSANDS/ÂΜL (ref 0.6–4.47)
LYMPHOCYTES NFR BLD AUTO: 24 % (ref 14–44)
MCH RBC QN AUTO: 31.7 PG (ref 26.8–34.3)
MCHC RBC AUTO-ENTMCNC: 35.9 G/DL (ref 31.4–37.4)
MCV RBC AUTO: 88 FL (ref 82–98)
MONOCYTES # BLD AUTO: 0.95 THOUSAND/ÂΜL (ref 0.17–1.22)
MONOCYTES NFR BLD AUTO: 7 % (ref 4–12)
NEUTROPHILS # BLD AUTO: 10 THOUSANDS/ÂΜL (ref 1.85–7.62)
NEUTS SEG NFR BLD AUTO: 69 % (ref 43–75)
NITRITE UR QL STRIP: NEGATIVE
NRBC BLD AUTO-RTO: 0 /100 WBCS
PH UR STRIP.AUTO: 7.5 [PH]
PLATELET # BLD AUTO: 241 THOUSANDS/UL (ref 149–390)
PMV BLD AUTO: 9.7 FL (ref 8.9–12.7)
POTASSIUM SERPL-SCNC: 3.5 MMOL/L (ref 3.5–5.3)
PROT SERPL-MCNC: 7.4 G/DL (ref 6.4–8.4)
PROT UR STRIP-MCNC: NEGATIVE MG/DL
PROTHROMBIN TIME: 13.4 SECONDS (ref 11.6–14.5)
RBC # BLD AUTO: 5.08 MILLION/UL (ref 3.88–5.62)
SODIUM SERPL-SCNC: 139 MMOL/L (ref 135–147)
SP GR UR STRIP.AUTO: 1.02 (ref 1–1.03)
UROBILINOGEN UR QL STRIP.AUTO: 0.2 E.U./DL
WBC # BLD AUTO: 14.45 THOUSAND/UL (ref 4.31–10.16)

## 2024-01-27 PROCEDURE — 99285 EMERGENCY DEPT VISIT HI MDM: CPT | Performed by: EMERGENCY MEDICINE

## 2024-01-27 PROCEDURE — G1004 CDSM NDSC: HCPCS

## 2024-01-27 PROCEDURE — 85610 PROTHROMBIN TIME: CPT | Performed by: EMERGENCY MEDICINE

## 2024-01-27 PROCEDURE — 99284 EMERGENCY DEPT VISIT MOD MDM: CPT

## 2024-01-27 PROCEDURE — 85025 COMPLETE CBC W/AUTO DIFF WBC: CPT | Performed by: EMERGENCY MEDICINE

## 2024-01-27 PROCEDURE — 96374 THER/PROPH/DIAG INJ IV PUSH: CPT

## 2024-01-27 PROCEDURE — 83690 ASSAY OF LIPASE: CPT | Performed by: EMERGENCY MEDICINE

## 2024-01-27 PROCEDURE — 74176 CT ABD & PELVIS W/O CONTRAST: CPT

## 2024-01-27 PROCEDURE — 96375 TX/PRO/DX INJ NEW DRUG ADDON: CPT

## 2024-01-27 PROCEDURE — 36415 COLL VENOUS BLD VENIPUNCTURE: CPT

## 2024-01-27 PROCEDURE — 83605 ASSAY OF LACTIC ACID: CPT | Performed by: EMERGENCY MEDICINE

## 2024-01-27 PROCEDURE — 81003 URINALYSIS AUTO W/O SCOPE: CPT | Performed by: EMERGENCY MEDICINE

## 2024-01-27 PROCEDURE — 96372 THER/PROPH/DIAG INJ SC/IM: CPT

## 2024-01-27 PROCEDURE — 80053 COMPREHEN METABOLIC PANEL: CPT | Performed by: EMERGENCY MEDICINE

## 2024-01-27 PROCEDURE — 85730 THROMBOPLASTIN TIME PARTIAL: CPT | Performed by: EMERGENCY MEDICINE

## 2024-01-27 PROCEDURE — 96361 HYDRATE IV INFUSION ADD-ON: CPT

## 2024-01-27 RX ORDER — FAMOTIDINE 20 MG/1
20 TABLET, FILM COATED ORAL 2 TIMES DAILY
Qty: 10 TABLET | Refills: 0 | Status: SHIPPED | OUTPATIENT
Start: 2024-01-27 | End: 2024-02-01

## 2024-01-27 RX ORDER — EPINEPHRINE 1 MG/ML
0.3 INJECTION, SOLUTION, CONCENTRATE INTRAVENOUS ONCE
Status: COMPLETED | OUTPATIENT
Start: 2024-01-27 | End: 2024-01-27

## 2024-01-27 RX ORDER — METHYLPREDNISOLONE SODIUM SUCCINATE 125 MG/2ML
125 INJECTION, POWDER, LYOPHILIZED, FOR SOLUTION INTRAMUSCULAR; INTRAVENOUS ONCE
Status: COMPLETED | OUTPATIENT
Start: 2024-01-27 | End: 2024-01-27

## 2024-01-27 RX ORDER — DIPHENHYDRAMINE HYDROCHLORIDE 50 MG/ML
25 INJECTION INTRAMUSCULAR; INTRAVENOUS ONCE
Status: COMPLETED | OUTPATIENT
Start: 2024-01-27 | End: 2024-01-27

## 2024-01-27 RX ADMIN — DIPHENHYDRAMINE HYDROCHLORIDE 25 MG: 50 INJECTION, SOLUTION INTRAMUSCULAR; INTRAVENOUS at 09:20

## 2024-01-27 RX ADMIN — EPINEPHRINE 0.3 MG: 1 INJECTION, SOLUTION, CONCENTRATE INTRAVENOUS at 09:19

## 2024-01-27 RX ADMIN — SODIUM CHLORIDE 1000 ML: 0.9 INJECTION, SOLUTION INTRAVENOUS at 09:19

## 2024-01-27 RX ADMIN — METHYLPREDNISOLONE SODIUM SUCCINATE 125 MG: 125 INJECTION, POWDER, FOR SOLUTION INTRAMUSCULAR; INTRAVENOUS at 09:20

## 2024-04-22 ENCOUNTER — OFFICE VISIT (OUTPATIENT)
Dept: URGENT CARE | Facility: CLINIC | Age: 24
End: 2024-04-22
Payer: COMMERCIAL

## 2024-04-22 VITALS
HEART RATE: 96 BPM | HEIGHT: 70 IN | RESPIRATION RATE: 16 BRPM | OXYGEN SATURATION: 99 % | WEIGHT: 128 LBS | BODY MASS INDEX: 18.32 KG/M2 | DIASTOLIC BLOOD PRESSURE: 72 MMHG | TEMPERATURE: 97.3 F | SYSTOLIC BLOOD PRESSURE: 120 MMHG

## 2024-04-22 DIAGNOSIS — H61.23 BILATERAL IMPACTED CERUMEN: Primary | ICD-10-CM

## 2024-04-22 PROCEDURE — 99213 OFFICE O/P EST LOW 20 MIN: CPT

## 2024-04-22 PROCEDURE — 69210 REMOVE IMPACTED EAR WAX UNI: CPT

## 2024-04-22 NOTE — PATIENT INSTRUCTIONS
Cerumen partially removed  Do not insert objects into the ears   Follow up with PCP in 3-5 days.  Proceed to  ER if symptoms worsen.    If tests have been performed at [facility], our office will contact you with results if changes need to be made to the care plan discussed with you at the visit.  You can review your full results on [facility] MyChart.  Earwax Blockage   AMBULATORY CARE:   Earwax  can build up in your ear canal and cause a blockage. Earwax blockage happens when your ear makes earwax faster than your body can remove it.        Common symptoms include the following:   Trouble hearing    Earache    Ear fullness or a feeling that something is plugging up your ear    Itching or ringing in your ear    Dizziness    Seed immediate care if:   You feel dizzy.    You have discharge or blood coming out of your ear.    Your ear pain does not go away or gets worse.    Call your doctor if:   You have a fever.     You have trouble hearing or hear ringing noises.    You have questions or concerns about your condition or care.    Treatment for earwax blockage:   Medicines  placed in the ear canal can soften the earwax so it will come out.    Flushing your ear canal  with warm water may flush out the earwax.    Small medical tools  may be used to remove the earwax.    How to prevent earwax blockage:  Do not stick anything into your ears to clean them. Use cotton swabs on the outside of your ear only. Ask your healthcare provider for more information on ways to prevent blockage.  Follow up with your doctor as directed:  Write down your questions so you remember to ask them during your visits.   © Copyright [employer] 2023 Information is for End User's use only and may not be sold, redistributed or otherwise used for commercial purposes.  The above information is an  only. It is not intended as medical advice for individual conditions or treatments. Talk to your doctor, nurse or pharmacist before following  any medical regimen to see if it is safe and effective for you.

## 2024-04-22 NOTE — PROGRESS NOTES
"  [facility]        NAME: Martínez Ellington is a 23 y.o. male  : 2000    MRN: [id number]  DATE: 2024  TIME: 10:55 AM    Assessment and Plan   Bilateral impacted cerumen [H61.23]  1. Bilateral impacted cerumen  Ear cerumen removal        Ear cerumen removal    Date/Time: 2024 10:54 AM    Performed by: ALEXYS Dai  Authorized by: DO Sophia Delgado Protocol:  Consent: Verbal consent obtained.  Risks and benefits: risks, benefits and alternatives were discussed  Consent given by: patient  Time out: Immediately prior to procedure a \"time out\" was called to verify the correct patient, procedure, equipment, support staff and site/side marked as required.  Patient understanding: patient states understanding of the procedure being performed  Patient identity confirmed: verbally with patient    Patient location:  Clinic  Procedure details:     Location:  R ear and L ear    Procedure type: curette    Post-procedure details:     Complication:  None    Hearing quality:  Improved    Patient tolerance of procedure:  Tolerated well, no immediate complications  Comments:      Partial cerumen removed as impacted and refusing irrigation/lavage     Bilateral cerumen impactions partially removed with curette use only however unable to view either TM due to persistent cerumen. Procedure ended as close to TM and do not want to cause damage. Patient refusing irrigation/lavage. Tolerated well and reported symptom improving. Encouraged continued supportive measures.  Follow up with PCP in 3-5 days or proceed to emergency department for worsening symptoms.  Patient verbalized understanding of instructions given.       Patient Instructions     Patient Instructions   Cerumen partially removed  Do not insert objects into the ears   Follow up with PCP in 3-5 days.  Proceed to  ER if symptoms worsen.    If tests have been performed at [facility], our office will contact you with results if " changes need to be made to the care plan discussed with you at the visit.  You can review your full results on [facility] MyChart.  Earwax Blockage   AMBULATORY CARE:   Earwax  can build up in your ear canal and cause a blockage. Earwax blockage happens when your ear makes earwax faster than your body can remove it.        Common symptoms include the following:   Trouble hearing    Earache    Ear fullness or a feeling that something is plugging up your ear    Itching or ringing in your ear    Dizziness    Seed immediate care if:   You feel dizzy.    You have discharge or blood coming out of your ear.    Your ear pain does not go away or gets worse.    Call your doctor if:   You have a fever.     You have trouble hearing or hear ringing noises.    You have questions or concerns about your condition or care.    Treatment for earwax blockage:   Medicines  placed in the ear canal can soften the earwax so it will come out.    Flushing your ear canal  with warm water may flush out the earwax.    Small medical tools  may be used to remove the earwax.    How to prevent earwax blockage:  Do not stick anything into your ears to clean them. Use cotton swabs on the outside of your ear only. Ask your healthcare provider for more information on ways to prevent blockage.  Follow up with your doctor as directed:  Write down your questions so you remember to ask them during your visits.   © Copyright [employer] 2023 Information is for End User's use only and may not be sold, redistributed or otherwise used for commercial purposes.  The above information is an  only. It is not intended as medical advice for individual conditions or treatments. Talk to your doctor, nurse or pharmacist before following any medical regimen to see if it is safe and effective for you.        Chief Complaint     Chief Complaint   Patient presents with    Earache     Right ear pain X 1 day. Hx of  ear infections and tubes in ears as a child  "        History of Present Illness       23-year-old male with no significant past medical history presents with complaints of bilateral earwax blockages.  Patient reports history of same and does follow with ENT.  Patient reports right TM perforation and so is declining irrigation/lavage but would like removal with curette.  Has tried OTC Debrox drops but unable to tolerate as causes \"burning.\" Denies fever or URI symptoms.        Review of Systems   Review of Systems   Constitutional:  Negative for chills and fever.   HENT:  Positive for ear pain and hearing loss. Negative for congestion, ear discharge, rhinorrhea and sore throat.    Eyes:  Negative for discharge.   Respiratory:  Negative for cough.    Gastrointestinal:  Negative for abdominal pain, diarrhea, nausea and vomiting.   Skin:  Negative for rash.         Current Medications       Current Outpatient Medications:     famotidine (PEPCID) 20 mg tablet, Take 1 tablet (20 mg total) by mouth 2 (two) times a day for 5 days, Disp: 10 tablet, Rfl: 0    hydrOXYzine HCL (ATARAX) 25 mg tablet, Take 1 tablet (25 mg total) by mouth every 6 (six) hours (Patient not taking: Reported on 4/22/2024), Disp: 12 tablet, Rfl: 0    Current Allergies     Allergies as of 04/22/2024 - Reviewed 04/22/2024   Allergen Reaction Noted    Amoxicillin Anaphylaxis 01/16/2007    Penicillin g Anaphylaxis 10/19/2023            The following portions of the patient's history were reviewed and updated as appropriate: allergies, current medications, past family history, past medical history, past social history, past surgical history and problem list.     Past Medical History:   Diagnosis Date    Concussion     Perforated ear drum        Past Surgical History:   Procedure Laterality Date    TYMPANOSTOMY TUBE PLACEMENT Bilateral 2006       Family History   Problem Relation Age of Onset    No Known Problems Mother     No Known Problems Father          Medications have been " "verified.        Objective   /72   Pulse 96   Temp (!) 97.3 °F (36.3 °C)   Resp 16   Ht 5' 10\" (1.778 m)   Wt 58.1 kg (128 lb)   SpO2 99%   BMI 18.37 kg/m²   No LMP for male patient.       Physical Exam     Physical Exam  Vitals and nursing note reviewed.   Constitutional:       General: He is not in acute distress.     Appearance: He is not toxic-appearing.   HENT:      Head: Normocephalic.      Right Ear: There is impacted cerumen.      Left Ear: There is impacted cerumen.      Nose: Nose normal.      Mouth/Throat:      Mouth: Mucous membranes are moist.      Pharynx: Oropharynx is clear.   Eyes:      Conjunctiva/sclera: Conjunctivae normal.   Pulmonary:      Effort: Pulmonary effort is normal.   Skin:     General: Skin is warm and dry.   Neurological:      Mental Status: He is alert and oriented to person, place, and time.      Gait: Gait is intact.   Psychiatric:         Mood and Affect: Mood normal.         Behavior: Behavior normal.                   "

## 2025-04-30 ENCOUNTER — OFFICE VISIT (OUTPATIENT)
Dept: URGENT CARE | Facility: CLINIC | Age: 25
End: 2025-04-30
Payer: COMMERCIAL

## 2025-04-30 VITALS
DIASTOLIC BLOOD PRESSURE: 82 MMHG | BODY MASS INDEX: 18.61 KG/M2 | RESPIRATION RATE: 16 BRPM | TEMPERATURE: 97.9 F | SYSTOLIC BLOOD PRESSURE: 127 MMHG | HEART RATE: 110 BPM | HEIGHT: 70 IN | WEIGHT: 130 LBS | OXYGEN SATURATION: 98 %

## 2025-04-30 DIAGNOSIS — H61.23 BILATERAL IMPACTED CERUMEN: Primary | ICD-10-CM

## 2025-04-30 DIAGNOSIS — L50.9 HIVES: ICD-10-CM

## 2025-04-30 PROCEDURE — 69210 REMOVE IMPACTED EAR WAX UNI: CPT

## 2025-04-30 PROCEDURE — 99213 OFFICE O/P EST LOW 20 MIN: CPT

## 2025-04-30 NOTE — LETTER
April 30, 2025     Patient: Martínez Ellington   YOB: 2000   Date of Visit: 4/30/2025       To Whom It May Concern:    It is my medical opinion that Martínez Ellington may return to work on 5/1/2025 .    If you have any questions or concerns, please don't hesitate to call.         Sincerely,        Chandler Shipman PA-C    CC: No Recipients

## 2025-04-30 NOTE — PATIENT INSTRUCTIONS
Use Debrox drops to help soften wax   Follow up with PCP in 3-5 days.  Proceed to  ER if symptoms worsen.    If tests are performed, our office will contact you with results only if changes need to made to the care plan discussed with you at the visit. You can review your full results on [facility] My[facility].

## 2025-04-30 NOTE — PROGRESS NOTES
"  [facility]        NAME: Martínez Ellington is a 24 y.o. male  : 2000    MRN: [id number]  DATE: 2025  TIME: 7:57 PM    Assessment and Plan   Bilateral impacted cerumen [H61.23]  1. Bilateral impacted cerumen        2. Hives          Was able to remove part of cerumen bilaterally with curette but unable to remove all of it.  Patient declined water irrigation.  Patient has also been having \"seasonal hives\" this past week.  No current hives on exam but needed work note.     Patient Instructions     Use Debrox drops to help soften wax   Follow up with PCP in 3-5 days.  Proceed to  ER if symptoms worsen.    If tests are performed, our office will contact you with results only if changes need to made to the care plan discussed with you at the visit. [facility]    Chief Complaint     Chief Complaint   Patient presents with    Earache     Left ear feels full he states he has had this issue in the past          History of Present Illness       Ear Fullness   There is pain in the left ear. This is a new problem. The current episode started today. There has been no fever. Pertinent negatives include no coughing, ear discharge, headaches, rhinorrhea or sore throat.   Patient states that he had his eardrum ruptured with water irrigation in the past so prefers only having the curette done.  He states he knows that this method may not be able to get all of the cerumen out but wants to at least have the part of it removed.  Also, patient states that he has been dealing with seasonal hives this past week.  He has been taking Benadryl and applying steroid cream to the areas that are affected.  He denies any shortness of breath, difficulty breathing or swallowing with these hives.    Review of Systems   Review of Systems   Constitutional:  Negative for chills, fatigue and fever.   HENT:  Positive for ear pain (fullness bilaterally worse in the L ear). Negative " "for congestion, ear discharge, postnasal drip, rhinorrhea, sinus pressure, sneezing, sore throat and tinnitus.    Eyes:  Negative for photophobia, redness and itching.   Respiratory:  Negative for cough, chest tightness, shortness of breath and wheezing.    Cardiovascular:  Negative for chest pain.   Skin:  Negative for color change and pallor.   Neurological:  Negative for dizziness, light-headedness and headaches.   Psychiatric/Behavioral:  Negative for confusion.          Current Medications     No current outpatient medications on file.    Current Allergies     Allergies as of 04/30/2025 - Reviewed 04/22/2024   Allergen Reaction Noted    Amoxicillin Anaphylaxis 01/16/2007    Penicillin g Anaphylaxis 10/19/2023            The following portions of the patient's history were reviewed and updated as appropriate: allergies, current medications, past family history, past medical history, past social history, past surgical history and problem list.     Past Medical History:   Diagnosis Date    Concussion     Perforated ear drum        Past Surgical History:   Procedure Laterality Date    TYMPANOSTOMY TUBE PLACEMENT Bilateral 2006       Family History   Problem Relation Age of Onset    No Known Problems Mother     No Known Problems Father          Medications have been verified.        Objective   /82   Pulse (!) 110   Temp 97.9 °F (36.6 °C)   Resp 16   Ht 5' 10\" (1.778 m)   Wt 59 kg (130 lb)   SpO2 98%   BMI 18.65 kg/m²        Physical Exam     Physical Exam  Constitutional:       Appearance: Normal appearance.   HENT:      Right Ear: There is impacted cerumen.      Left Ear: There is impacted cerumen.   Eyes:      Extraocular Movements: Extraocular movements intact.      Pupils: Pupils are equal, round, and reactive to light.   Cardiovascular:      Rate and Rhythm: Normal rate.      Pulses: Normal pulses.   Pulmonary:      Effort: Pulmonary effort is normal.   Skin:     General: Skin is warm. "   Neurological:      General: No focal deficit present.      Mental Status: He is alert and oriented to person, place, and time. Mental status is at baseline.       Ear cerumen removal    Date/Time: 4/30/2025 7:30 PM    Performed by: Chandler Shipman PA-C  Authorized by: Chandler Shipman PA-C  [place] Protocol:  procedure performed by consultantConsent: Verbal consent obtained.  Risks and benefits: risks, benefits and alternatives were discussed  Consent given by: patient    Patient location:  Bedside  Procedure details:     Location:  Both ears    Procedure type: curette      Approach:  External  Post-procedure details:     Complication:  None    Hearing quality:  Normal    Patient tolerance of procedure:  Tolerated well, no immediate complications  Comments:      Unable to fully remove cerumen bilaterally